# Patient Record
Sex: FEMALE | Race: WHITE | NOT HISPANIC OR LATINO | URBAN - METROPOLITAN AREA
[De-identification: names, ages, dates, MRNs, and addresses within clinical notes are randomized per-mention and may not be internally consistent; named-entity substitution may affect disease eponyms.]

---
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
## 2021-09-03 ENCOUNTER — APPOINTMENT (OUTPATIENT)
Dept: NEUROLOGY | Facility: CLINIC | Age: 21
End: 2021-09-03
Payer: COMMERCIAL

## 2021-09-03 PROCEDURE — 99213 OFFICE O/P EST LOW 20 MIN: CPT | Mod: 95

## 2021-09-03 RX ORDER — ATOMOXETINE 60 MG/1
60 CAPSULE ORAL
Qty: 30 | Refills: 1 | Status: COMPLETED | COMMUNITY
Start: 2020-08-21 | End: 2021-09-03

## 2021-09-03 NOTE — HISTORY OF PRESENT ILLNESS
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  However still having difficulties.\par \par Propranolol for anxiety, just 10mg and working well.\par Methylphenidate, 10mg and 20mg as needed.  Did not seem enough end of last semester, and having a lot of problems with restart of classes.\par \par atomoxetine had ended Dec/Jan, and thought it had done well, had helped with her sleep and wakefulness more than now.\par \par Vasiliy of 16 initial visit, reports she may be even sleepier during the day now.

## 2021-09-03 NOTE — REASON FOR VISIT
[Home] : at home, [unfilled] , at the time of the visit. [Medical Office: ([facility])___] : at the medical office located in  [Verbal consent obtained from patient] : the patient, [unfilled] [Follow-Up: _____] : a [unfilled] follow-up visit

## 2021-09-03 NOTE — DISCUSSION/SUMMARY
[FreeTextEntry1] : Impression:\par 1) excessive daytime somnolence, falls asleep in class - HST AHI 0. Some snoring.  [place] > 16.  Possibly narcolepsy?\par 2) ADD\par \par \par Plan:\par 1) PSG/MSLT for narcolepsy\par 2) methylphenidate restart up to 30mg/d, last dose up to 2pm.\par 3) after MSLT, restart atomoxetine, push it up quickly as prior experience, start at 18mg

## 2021-11-12 ENCOUNTER — APPOINTMENT (OUTPATIENT)
Dept: SLEEP CENTER | Facility: HOSPITAL | Age: 21
End: 2021-11-12

## 2021-11-12 ENCOUNTER — OUTPATIENT (OUTPATIENT)
Dept: OUTPATIENT SERVICES | Facility: HOSPITAL | Age: 21
LOS: 1 days | End: 2021-11-12
Payer: COMMERCIAL

## 2021-11-12 DIAGNOSIS — G47.33 OBSTRUCTIVE SLEEP APNEA (ADULT) (PEDIATRIC): ICD-10-CM

## 2021-11-12 PROCEDURE — 95810 POLYSOM 6/> YRS 4/> PARAM: CPT

## 2021-11-12 PROCEDURE — 95805 MULTIPLE SLEEP LATENCY TEST: CPT

## 2021-11-12 PROCEDURE — 95810 POLYSOM 6/> YRS 4/> PARAM: CPT | Mod: 26

## 2021-11-13 ENCOUNTER — OUTPATIENT (OUTPATIENT)
Dept: OUTPATIENT SERVICES | Facility: HOSPITAL | Age: 21
LOS: 1 days | End: 2021-11-13
Payer: COMMERCIAL

## 2021-11-13 ENCOUNTER — APPOINTMENT (OUTPATIENT)
Dept: SLEEP CENTER | Facility: HOSPITAL | Age: 21
End: 2021-11-13

## 2021-11-13 PROCEDURE — 95805 MULTIPLE SLEEP LATENCY TEST: CPT | Mod: 26

## 2021-11-13 PROCEDURE — 95805 MULTIPLE SLEEP LATENCY TEST: CPT

## 2021-11-15 ENCOUNTER — TRANSCRIPTION ENCOUNTER (OUTPATIENT)
Age: 21
End: 2021-11-15

## 2021-11-15 DIAGNOSIS — G47.33 OBSTRUCTIVE SLEEP APNEA (ADULT) (PEDIATRIC): ICD-10-CM

## 2021-11-23 ENCOUNTER — APPOINTMENT (OUTPATIENT)
Dept: NEUROLOGY | Facility: CLINIC | Age: 21
End: 2021-11-23
Payer: COMMERCIAL

## 2021-11-23 PROCEDURE — 99214 OFFICE O/P EST MOD 30 MIN: CPT | Mod: 95

## 2021-11-23 NOTE — DISCUSSION/SUMMARY
[FreeTextEntry1] : Impression:\par 1) EDS, probable narcolepsy vs significant hypersomnia, adverse effects to methylphenidate at 20mg, intolerable anxiety.  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

## 2022-04-06 ENCOUNTER — NON-APPOINTMENT (OUTPATIENT)
Age: 22
End: 2022-04-06

## 2022-05-10 ENCOUNTER — APPOINTMENT (OUTPATIENT)
Dept: NEUROLOGY | Facility: CLINIC | Age: 22
End: 2022-05-10
Payer: COMMERCIAL

## 2022-05-10 DIAGNOSIS — G47.26 CIRCADIAN RHYTHM SLEEP DISORDER, SHIFT WORK TYPE: ICD-10-CM

## 2022-05-10 PROCEDURE — 99214 OFFICE O/P EST MOD 30 MIN: CPT | Mod: 95

## 2022-05-10 NOTE — REASON FOR VISIT
[Home] : at home, [unfilled] , at the time of the visit. [Medical Office: ([facility])___] : at the medical office located in  [Verbal consent obtained from patient] : the patient, [unfilled] [Follow-Up: _____] : a [unfilled] follow-up visit

## 2022-05-10 NOTE — DISCUSSION/SUMMARY
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  PSG/MSLT not confirmatory - felt she was extremely anxious during the testing however, fell asleep during the hook-up, then had performance anxiety about the testing, and was emotional through the testing and became upset.  Modafinil however is helping significantly, though not perfect yet.  Adverse effects are possibly limited.\par 2) Attention deficits affecting;  \par \par Plan:\par 1) modafinil continue at 100mg daily\par 2) trail of methylphenidate 5mg up to 3/day\par 3) labs to r/o autoimmune etiology of hypersomnia.\par 4) propranolol can be increased as needed for anxiety/palps.

## 2022-05-10 NOTE — HISTORY OF PRESENT ILLNESS
[FreeTextEntry1] : Liberty returns for fu, last seen Nov 23, 2021.\par \par Hypersomnia - but also significant ADD symptoms.\par PSG/MSLT without significant pathology.\par \par Propranolol 10mg bid\par Modafinil at 100mg/d\par Currently feels like it makes a big difference, but still may feel very sleepy twice a week.\par Initially was feeling anxious, but may or may not have been related to the medications.\par Also found possible interactions with OCP - acne returned and spotting.  Will be meeting with GYN next month.\par Avoiding coffee.  Has not noticed a lot of racing but may still occur.\par \par Has trouble with her focus for more than a few moments, then opens up other tabs.\par Homework with her friends is helpful for her.\par \par She was started on propranolol and methylphenidate together.\par methyphenidate 20mg qam, Initially she was very productive and she stayed awake.\par Her anxiety had worsened however, to the point she may have needed to vomit, about 30-35 minutes later.\par She tried this for a few weeks then stopped.\par The atomoxetine, which she does not recall having as much anxiety,\par \par  Anxiety improved, going to therapy weekly.\par \par Morning classes at 8:30am\par Some classes end at 7:30pm\par Study groups late evening, ending at 1am\par \par Prior:\par She finds that she is falling asleep in class, even if she slept well the night before.\par She is extremely frustrated by the sleep pressure, and even sometimes finds herself crying because she just cannot control the sleep that is occurring, and she has fought this problem her whole life.\par Her concentration has not been great as well.\par \par 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  However still having difficulties.\par \par Propranolol for anxiety, just 10mg and working well.\par Methylphenidate, 10mg and 20mg as needed.  Did not seem enough end of last semester, and having a lot of problems with restart of classes.\par \par atomoxetine had ended Dec/Jan, and thought it had done well, had helped with her sleep and wakefulness more than now.\par \par [place] of 16 initial visit, reports she may be even sleepier during the day now.

## 2022-06-10 ENCOUNTER — NON-APPOINTMENT (OUTPATIENT)
Age: 22
End: 2022-06-10

## 2022-06-13 ENCOUNTER — APPOINTMENT (OUTPATIENT)
Dept: NEUROLOGY | Facility: CLINIC | Age: 22
End: 2022-06-13

## 2022-06-13 VITALS
WEIGHT: 115 LBS | SYSTOLIC BLOOD PRESSURE: 122 MMHG | HEART RATE: 97 BPM | DIASTOLIC BLOOD PRESSURE: 78 MMHG | TEMPERATURE: 98.4 F | HEIGHT: 61 IN | BODY MASS INDEX: 21.71 KG/M2 | OXYGEN SATURATION: 97 %

## 2022-06-13 PROCEDURE — 99214 OFFICE O/P EST MOD 30 MIN: CPT

## 2022-06-13 RX ORDER — METHYLPHENIDATE HYDROCHLORIDE 10 MG/1
10 TABLET ORAL
Qty: 90 | Refills: 0 | Status: DISCONTINUED | COMMUNITY
Start: 2020-12-10 | End: 2022-06-13

## 2022-06-14 LAB
CRP SERPL-MCNC: <3 MG/L
ERYTHROCYTE [SEDIMENTATION RATE] IN BLOOD BY WESTERGREN METHOD: 19 MM/HR
RHEUMATOID FACT SER QL: <10 IU/ML
THYROGLOB AB SERPL-ACNC: <20 IU/ML
THYROPEROXIDASE AB SERPL IA-ACNC: <10 IU/ML

## 2022-06-15 LAB
ENA SS-A AB SER IA-ACNC: <0.2 AL
ENA SS-B AB SER IA-ACNC: <0.2 AL

## 2022-06-16 LAB
A-TUMOR NECROSIS FACT SERPL-MCNC: <1.7 PG/ML
ANA SER IF-ACNC: NEGATIVE
IGNF SERPL-MCNC: <4.2 PG/ML
IL10 SERPL-MCNC: <2.8 PG/ML
IL12 SERPL-MCNC: <1.9 PG/ML
IL13 SERPL-MCNC: <1.7 PG/ML
IL17A SERPL-MCNC: <1.4 PG/ML
IL2 SERPL-MCNC: 554.3 PG/ML
IL2 SERPL-MCNC: <2.1 PG/ML
IL4 SERPL-MCNC: <2.2 PG/ML
IL6 SERPL-MCNC: <2 PG/ML
IL8 SERPL-MCNC: <3 PG/ML
INTERLEUKIN 1 BETA: <6.5 PG/ML
INTERLEUKIN 5: <2.1 PG/ML

## 2022-06-17 LAB
NMDA RECEPTOR AB N-METHYL-D-ASPARTATE RECEPTOR AB IGG, SERUM WITH REFLEX TO TITER: NORMAL
VGKC AB SER-SCNC: 7 PMOL/L

## 2022-06-22 LAB — PARANEOPLASTIC AB PNL SER: NORMAL

## 2022-07-10 NOTE — HISTORY OF PRESENT ILLNESS
[FreeTextEntry1] : Liberty 22 years old returns f/u visit \par \par \par Reports feeling "very weird" feels really disconnected, not mentally there, and, feels like nothing is real \par Family and friends have not noticed - \par school has been ok, gets decent grades - still feels not normal -  \par Reports feelings are not so serious to be suicidal  - but feels a sense of hopelessness and disassociated  \par \par Symptoms started a few months ago in March - \par Reports stop taking medication two weeks ago as a result \par Discontinued taking Propranolol and Modafinil - 2 weeks ago -\par ran out of meds and did not refill\par symptoms started to worsen a week before discontinuing the medication and got even worse after.\par since not taking meds - feels more up down. \par Feels happy with friends and family, and her boyfriend - but gets paranoid as a result- \par \par Report taking Methylphenidate during finals week and said it helped. \par Also report drinking coffee occasionally that makes her anxious.  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  Will be meeting with GYN next month.\par Avoiding coffee.  Has not noticed a lot of racing but may still occur.\par \par Has trouble with her focus for more than a few moments, then opens up other tabs.\par Homework with her friends is helpful for her.\par \par She was started on propranolol and methylphenidate together.\par methyphenidate 20mg qam, Initially she was very productive and she stayed awake.\par Her anxiety had worsened however, to the point she may have needed to vomit, about 30-35 minutes later.\par She tried this for a few weeks then stopped.\par The atomoxetine, which she does not recall having as much anxiety,\par \par  Anxiety improved, going to therapy weekly.\par \par Morning classes at 8:30am\par Some classes end at 7:30pm\par Study groups late evening, ending at 1am\par \par Prior:\par She finds that she is falling asleep in class, even if she slept well the night before.\par She is extremely frustrated by the sleep pressure, and even sometimes finds herself crying because she just cannot control the sleep that is occurring, and she has fought this problem her whole life.\par Her concentration has not been great as well.\par \par 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  However still having difficulties.\par \par Propranolol for anxiety, just 10mg and working well.\par Methylphenidate, 10mg and 20mg as needed.  Did not seem enough end of last semester, and having a lot of problems with restart of classes.\par \par atomoxetine had ended Dec/Jan, and thought it had done well, had helped with her sleep and wakefulness more than now.\par \par Vasiliy of 16 initial visit, reports she may be even sleepier during the day now.

## 2022-07-10 NOTE — PHYSICAL EXAM
[General Appearance - Alert] : alert [General Appearance - In No Acute Distress] : in no acute distress [FreeTextEntry1] : General:\par Constitutional:  Sitting comfortably in NAD.\par Psychiatric: well-groomed, appropriate affect, insight/judgment intact\par \par Cognitive:\par Orientation, language, memory and knowledge screens intact.\par No expressive or receptive errors.\par \par Cranial Nerves:\par VII: Face appears symmetric \par \par Motor:\par Power: no pronator drift.\par

## 2022-07-10 NOTE — DISCUSSION/SUMMARY
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  PSG/MSLT not confirmatory - felt she was extremely anxious during the testing however, fell asleep during the hook-up, then had performance anxiety about the testing, and was emotional through the testing and became upset.  Possibly false negarive narcolepsy.  Modafinil however is helping significantly, though not perfect yet.  Adverse effects reasonable.  Will continue wake-promoting adjuncts.\par 2) Attention deficits affecting performance.\par \par Plan:\par 1) Trial with venlafaxine 50mg 1 tab PO daily x2 weeks then 1 tab BID\par 2) labs to r/o autoimmune etiology of hypersomnia.

## 2022-07-10 NOTE — END OF VISIT
[FreeTextEntry3] : I personally saw and evaluated this patient with NP [place] and agree with the history, exam and plan as outlined in this note.

## 2022-08-22 ENCOUNTER — RX RENEWAL (OUTPATIENT)
Age: 22
End: 2022-08-22

## 2022-08-22 ENCOUNTER — APPOINTMENT (OUTPATIENT)
Dept: NEUROLOGY | Facility: CLINIC | Age: 22
End: 2022-08-22

## 2022-08-22 DIAGNOSIS — F41.9 ANXIETY DISORDER, UNSPECIFIED: ICD-10-CM

## 2022-08-22 PROCEDURE — 99214 OFFICE O/P EST MOD 30 MIN: CPT | Mod: 95

## 2022-08-22 RX ORDER — DROSPIRENONE/ETHINYL ESTRADIOL/LEVOMEFOLATE CALCIUM AND LEVOMEFOLATE CALCIUM 3-0.02(24)
KIT ORAL
Refills: 0 | Status: ACTIVE | COMMUNITY

## 2022-08-22 NOTE — REASON FOR VISIT
[Home] : at home, [unfilled] , at the time of the visit. [Medical Office: ([facility])___] : at the medical office located in  [Patient] : the patient [Follow-Up: _____] : a [unfilled] follow-up visit

## 2022-08-24 PROBLEM — F41.9 ANXIETY: Status: ACTIVE | Noted: 2020-08-21

## 2022-08-24 NOTE — HISTORY OF PRESENT ILLNESS
[FreeTextEntry1] : Liberty returns for fu, last seen Nov 23, 2021.\par \par Hypersomnia - but also significant ADD symptoms.\par PSG/MSLT without significant pathology.\par \par On venlafaxine 50 mg bid.  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, and now she is a Sr in college.\par She has trouble focusing as well.\par \par Feels drained more easily.\par \par Sleep Routine:\par Retires: 11pm\par Latency:  very quickly\par Arousals:  wakes up at least once a day on venlafaxine\par Awakens: 6 or 6:30am, can wake up easily and other times needs alarms.\par \par Sleep/wake aids attempted/failed:  \par Wakeman:\par Sleep paralysis: no\par Hypnagogic hypnopompic hallucinations:  hypnopompic yes, blends\par Cataplexy: Possible - during a fight with sibling - she will have a sense or a rush of her head elie or eyes jump and she feels paused, duration about 2 seconds.\par \par \par \par Currently feels like it makes a big difference, but still may feel very sleepy twice a week.\par Initially was feeling anxious, but may or may not have been related to the medications.\par Also found possible interactions with OCP - acne returned and spotting.  Will be meeting with GYN next month.\par Avoiding coffee.  Has not noticed a lot of racing but may still occur.\par \par Has trouble with her focus for more than a few moments, then opens up other tabs.\par Homework with her friends is helpful for her.\par \par She was started on propranolol and methylphenidate together.\par methyphenidate 20mg qam, Initially she was very productive and she stayed awake.\par Her anxiety had worsened however, to the point she may have needed to vomit, about 30-35 minutes later.\par She tried this for a few weeks then stopped.\par The atomoxetine, which she does not recall having as much anxiety,\par \par  Anxiety improved, going to therapy weekly.\par \par Morning classes at 8:30am\par Some classes end at 7:30pm\par Study groups late evening, ending at 1am\par \par Prior:\par She finds that she is falling asleep in class, even if she slept well the night before.\par She is extremely frustrated by the sleep pressure, and even sometimes finds herself crying because she just cannot control the sleep that is occurring, and she has fought this problem her whole life.\par Her concentration has not been great as well.\par \par 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  However still having difficulties.\par \par Propranolol for anxiety, just 10mg and working well.\par Methylphenidate, 10mg and 20mg as needed.  Did not seem enough end of last semester, and having a lot of problems with restart of classes.\par \par atomoxetine had ended Dec/Jan, and thought it had done well, had helped with her sleep and wakefulness more than now.\par \par [place]ak[place] of 16 initial visit, reports she may be even sleepier during the day now.

## 2022-08-24 NOTE — CONSULT LETTER
[Dear  ___] : Dear  [unfilled], [Courtesy Letter:] : I had the pleasure of seeing your patient, [unfilled], in my office today. [Please see my note below.] : Please see my note below. [Consult Closing:] : Thank you very much for allowing me to participate in the care of this patient.  If you have any questions, please do not hesitate to contact me. [Sincerely,] : Sincerely, [FreeTextEntry3] : Yadiel Mondragon MD MSc\par Vice-Chair, Neurology

## 2022-08-24 NOTE — DISCUSSION/SUMMARY
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  PSG/MSLT not confirmatory - felt she was extremely anxious during the testing however, fell asleep during the hook-up, then had performance anxiety about the testing, and was emotional through the testing and became upset.  Modafinil however is helping significantly, though not perfect yet. 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

## 2022-08-24 NOTE — PHYSICAL EXAM
diltiazem ER COATED BEADS (CARDIZEM CD/CARTIA XT) 240 MG 24 hr capsule   Last Written Prescription Date:  2/22/19  Last Fill Quantity: 90 # refills: 3  Last Office Visit :2/20/20  Future Office visit:  3/26/20    90 day to pharmacy    Routing refill request to provider for review/approval because:  Alt, creat past due        
Orders placed. Appt scheduled on 3/26/2020.    АНДРЕЙ Mosqueda on 2/26/2020 at 7:24 AM    
[FreeTextEntry1] : General:\par Constitutional:  Sitting comfortably in NAD.\par Psychiatric: well-groomed, appropriate affect, insight/judgment intact\par \par Cognitive:\par Orientation, language, memory and knowledge screens intact.\par No expressive or receptive errors.\par \par Cranial Nerves:\par VII: Face appears symmetric \par \par Motor:\par Power: no pronator drift.\par

## 2022-09-06 LAB
A-TUMOR NECROSIS FACT SERPL-MCNC: <1.7 PG/ML
IGNF SERPL-MCNC: <4.2 PG/ML
IL10 SERPL-MCNC: 3.6 PG/ML
IL12 SERPL-MCNC: <1.9 PG/ML
IL13 SERPL-MCNC: <1.7 PG/ML
IL17A SERPL-MCNC: <1.4 PG/ML
IL2 SERPL-MCNC: 612 PG/ML
IL2 SERPL-MCNC: <2.1 PG/ML
IL4 SERPL-MCNC: <2.2 PG/ML
IL6 SERPL-MCNC: <2 PG/ML
IL8 SERPL-MCNC: <3 PG/ML
INTERLEUKIN 1 BETA: <6.5 PG/ML
INTERLEUKIN 5: <2.1 PG/ML
THYROGLOB AB SERPL-ACNC: <20 IU/ML
THYROPEROXIDASE AB SERPL IA-ACNC: <10 IU/ML

## 2022-09-07 LAB — ANA SER IF-ACNC: NEGATIVE

## 2022-09-13 LAB
AMPA-R ABCBA: NEGATIVE
AMPHIPHYSIN IGG TITR SER IF: NEGATIVE TITER
CASPR2-IGG CBA, S: NEGATIVE
CV2 IGG TITR SER: NEGATIVE TITER
GABA-B ABCBA: NEGATIVE
GAD65 AB SER-MCNC: 0 NMOL/L
GLIAL NUC TYPE 1 AB TITR SER: NEGATIVE TITER
HU1 AB TITR SER: NEGATIVE TITER
HU2 AB TITR SER IF: NEGATIVE TITER
HU3 AB TITR SER: NEGATIVE TITER
IGLON5 IFA, S: NEGATIVE
IMMUNOLOGIST REVIEW: NORMAL
LGI1-IGG CBA, S: NEGATIVE
NIF IFA, S: NEGATIVE
NMDA-R ABCBA: NEGATIVE
PCA-1 AB TITR SER: NEGATIVE TITER
PCA-2 AB TITR SER: NEGATIVE TITER
PCA-TR AB TITR SER: NEGATIVE TITER
REFLEX ADDED: NORMAL

## 2022-09-16 LAB
MISCELLANEOUS TEST: NORMAL
PROC NAME: NORMAL

## 2022-09-23 NOTE — HISTORY OF PRESENT ILLNESS
Bed locked, in lowest position. Call bell in reach. Purposeful rounding done every two hours. Blood glucose monitoring every 6 hours, no coverage given. Pain meds given as ordered per MAR. Chart check complete. Will continue with plan of care.    [FreeTextEntry1] : Liberty returns for fu, last seen \par \par She was started on propranolol and methylphenidate together.\par The dose was increased to 20mg qam.\par Initially she was very productive and she stayed awake.\par Her anxiety had worsened however, to the point she may have needed to vomit, about 30-35 minutes later.\par She tried this for a few weeks then stopped.\par \par In the past few weeks, she was on no medications.  Anxiety had improved.  She is going to therapy weekly.\par She is falling asleep in class though, and fighting sleep when driving - needing to talk to her mom to keep her awake, nodding off even with a lull in conversation.\par She has fallen asleep while taking a quiz at school.\par \par She is working really hard on her 4 classes to try to keep up, and struggling to stay awake, particularly with the morning classes.\par \par The atomoxetine, which she does not recall having as much anxiety,\par \par The PSG/MSLT results are not yet reported.\par \par \par Morning classes at 8:30am\par Some classes end at 7:30pm\par Study groups late evening, ending at 1am\par \par Prior:\par She finds that she is falling asleep in class, even if she slept well the night before.\par She is extremely frustrated by the sleep pressure, and even sometimes finds herself crying because she just cannot control the sleep that is occurring, and she has fought this problem her whole life.\par Her concentration has not been great as well.\par \par 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  However still having difficulties.\par \par Propranolol for anxiety, just 10mg and working well.\par Methylphenidate, 10mg and 20mg as needed.  Did not seem enough end of last semester, and having a lot of problems with restart of classes.\par \par atomoxetine had ended Dec/Jan, and thought it had done well, had helped with her sleep and wakefulness more than now.\par \par [place] of 16 initial visit, reports she may be even sleepier during the day now.

## 2023-02-16 ENCOUNTER — APPOINTMENT (OUTPATIENT)
Dept: NEUROLOGY | Facility: CLINIC | Age: 23
End: 2023-02-16

## 2023-08-23 RX ORDER — VENLAFAXINE HYDROCHLORIDE 225 MG/1
225 TABLET, EXTENDED RELEASE ORAL
Qty: 30 | Refills: 2 | Status: ACTIVE | COMMUNITY
Start: 2022-08-22 | End: 1900-01-01

## 2023-11-24 ENCOUNTER — APPOINTMENT (OUTPATIENT)
Dept: NEUROLOGY | Facility: CLINIC | Age: 23
End: 2023-11-24

## 2023-12-01 ENCOUNTER — APPOINTMENT (OUTPATIENT)
Dept: NEUROLOGY | Facility: CLINIC | Age: 23
End: 2023-12-01
Payer: COMMERCIAL

## 2023-12-01 VITALS
TEMPERATURE: 98.3 F | WEIGHT: 120 LBS | BODY MASS INDEX: 22.66 KG/M2 | SYSTOLIC BLOOD PRESSURE: 105 MMHG | HEART RATE: 86 BPM | OXYGEN SATURATION: 97 % | DIASTOLIC BLOOD PRESSURE: 69 MMHG | HEIGHT: 61 IN

## 2023-12-01 DIAGNOSIS — G47.411 NARCOLEPSY WITH CATAPLEXY: ICD-10-CM

## 2023-12-01 DIAGNOSIS — F98.8 OTHER SPECIFIED BEHAVIORAL AND EMOTIONAL DISORDERS WITH ONSET USUALLY OCCURRING IN CHILDHOOD AND ADOLESCENCE: ICD-10-CM

## 2023-12-01 DIAGNOSIS — G47.19 OTHER HYPERSOMNIA: ICD-10-CM

## 2023-12-01 PROCEDURE — 99214 OFFICE O/P EST MOD 30 MIN: CPT

## 2023-12-01 RX ORDER — ARIPIPRAZOLE 2 MG/1
2 TABLET ORAL
Qty: 30 | Refills: 0 | Status: ACTIVE | COMMUNITY
Start: 2023-12-01

## 2023-12-01 RX ORDER — METHYLPHENIDATE HYDROCHLORIDE 5 MG/1
5 TABLET ORAL
Qty: 45 | Refills: 0 | Status: COMPLETED | COMMUNITY
Start: 2022-08-22 | End: 2023-12-01

## 2023-12-01 RX ORDER — VENLAFAXINE 50 MG/1
50 TABLET ORAL TWICE DAILY
Qty: 60 | Refills: 2 | Status: COMPLETED | COMMUNITY
Start: 2022-06-13 | End: 2023-12-01

## 2023-12-06 PROBLEM — F98.8 ATTENTION DEFICIT DISORDER (ADD) IN ADULT: Status: ACTIVE | Noted: 2020-08-21

## 2023-12-06 PROBLEM — G47.411 CATAPLEXY: Status: ACTIVE | Noted: 2022-08-22

## 2023-12-06 PROBLEM — G47.19 EXCESSIVE DAYTIME SLEEPINESS: Status: ACTIVE | Noted: 2020-08-21

## 2023-12-20 NOTE — HISTORY OF PRESENT ILLNESS
[FreeTextEntry1] : 20 year old, right handed, female who presents today for evaluation of daytime sleepiness. \par \par Her complaints of daytime sleepiness started about three years ago (senior year of high school) and worsened over the last two years (entering into her sophomore year of college). \par \par She states that she tends to fall asleep in calm environments and when she is not very engaged in the situation. \par \par The last event was a week ago when she was sitting around with her friends on Sunday afternoon. She believes it starts with a weight on her eye lids and experiences the feeling that she just wants to crawl into bed. She can sleep for an entire lecture lasting at least an hour. \par \par She falls asleep daily while in college during lectures, will also happen during lab or group projects. It has become less while relaxing at home more due to the quarantine. She associates the times she falls asleep on the couch as naps versus day time sleepiness.\par \par The feeling of sleep slowly creeps up and then she cannot fight it off. Then when she wakes up she will be a little dazed but it will go away until her next lecture.  Feels frustrated after these events. Even after an event can fall sleep later in the day. \par \par Ermine Sleepiness Scale - 16 \par \par Sleep schedule: \par Retires: 1130 p - 12 am \par Sleeps: 7 - 8 hours (up at 8 am) *\par Wakes up naturally, feels refreshed most of the time\par Sleeps on her side \par Denies waking up in the middle of the night or waking up gasping for air. \par Mom does report some snoring and some sleep talking \par Denies restless or painful legs at night\par \par \par Sleep Event Diary: At a concert standing up and sitting down, lecture [place], 1:1 tutoring, group tutoring, driving car, working, watching movies and a play, at a bar, hanging out with friends, in a restaurant, and at a doctors office. \par \par In order to keep herself awake she will drink water, chew gum, and shake her leg to stay awake. She has also tried coffee but not cola. Denies trying any medications. \par \par Father has sleep apnea, most likely obstructive sleep apnea was a police man at 9/11.  \par \par Also complains of muscle weakness that she describes as generalized fatigue. It happens almost every day when she has to exert herself like walking up hills. She becomes out of breath and tired. This tiredness prevents her from going out with friends or doing things she would enjoy.\par \par Anxiety started about two years ago and recently started seeing a psychiatrist because it became worse when starting college. Started seeing a therapist at school in October 2019 and one from home about four weeks ago. She described it as generalized anxiety, constant worrying about irrational things. Describes them as spiraling out of control it is the only thing she can focus on and feels \par \par Possible attention deficit complaints. Mom notices certain things like packing for college, she will not complete one task without jumping to the next. The mom has to redirect her. Liberty's father is very similar.\par \par ADHD self report score - Part A score 5, Part B score 9.  \par \par In school to become an  at [employer]. 
0 (no pain/absence of nonverbal indicators of pain)